# Patient Record
Sex: MALE | Race: WHITE | URBAN - METROPOLITAN AREA
[De-identification: names, ages, dates, MRNs, and addresses within clinical notes are randomized per-mention and may not be internally consistent; named-entity substitution may affect disease eponyms.]

---

## 2017-09-12 ENCOUNTER — OFFICE VISIT (OUTPATIENT)
Dept: FAMILY MEDICINE | Facility: CLINIC | Age: 47
End: 2017-09-12
Payer: COMMERCIAL

## 2017-09-12 VITALS
DIASTOLIC BLOOD PRESSURE: 80 MMHG | TEMPERATURE: 97.3 F | SYSTOLIC BLOOD PRESSURE: 124 MMHG | HEART RATE: 80 BPM | OXYGEN SATURATION: 96 % | WEIGHT: 171.1 LBS | BODY MASS INDEX: 25.93 KG/M2 | RESPIRATION RATE: 16 BRPM | HEIGHT: 68 IN

## 2017-09-12 DIAGNOSIS — Z23 ENCOUNTER FOR IMMUNIZATION: ICD-10-CM

## 2017-09-12 DIAGNOSIS — Z23 NEED FOR PROPHYLACTIC VACCINATION AND INOCULATION AGAINST INFLUENZA: ICD-10-CM

## 2017-09-12 DIAGNOSIS — Z02.89 HISTORY AND PHYSICAL EXAMINATION, IMMIGRATION: Primary | ICD-10-CM

## 2017-09-12 PROCEDURE — 90471 IMMUNIZATION ADMIN: CPT | Performed by: FAMILY MEDICINE

## 2017-09-12 PROCEDURE — 90472 IMMUNIZATION ADMIN EACH ADD: CPT | Performed by: FAMILY MEDICINE

## 2017-09-12 PROCEDURE — 86592 SYPHILIS TEST NON-TREP QUAL: CPT | Performed by: FAMILY MEDICINE

## 2017-09-12 PROCEDURE — 86480 TB TEST CELL IMMUN MEASURE: CPT | Performed by: FAMILY MEDICINE

## 2017-09-12 PROCEDURE — 90632 HEPA VACCINE ADULT IM: CPT | Performed by: FAMILY MEDICINE

## 2017-09-12 PROCEDURE — 87591 N.GONORRHOEAE DNA AMP PROB: CPT | Performed by: FAMILY MEDICINE

## 2017-09-12 PROCEDURE — 86780 TREPONEMA PALLIDUM: CPT | Mod: 90 | Performed by: FAMILY MEDICINE

## 2017-09-12 PROCEDURE — 90715 TDAP VACCINE 7 YRS/> IM: CPT | Performed by: FAMILY MEDICINE

## 2017-09-12 PROCEDURE — 86762 RUBELLA ANTIBODY: CPT | Performed by: FAMILY MEDICINE

## 2017-09-12 PROCEDURE — 86780 TREPONEMA PALLIDUM: CPT | Performed by: FAMILY MEDICINE

## 2017-09-12 PROCEDURE — 99385 PREV VISIT NEW AGE 18-39: CPT | Mod: 25 | Performed by: FAMILY MEDICINE

## 2017-09-12 PROCEDURE — 90688 IIV4 VACCINE SPLT 0.5 ML IM: CPT | Performed by: FAMILY MEDICINE

## 2017-09-12 PROCEDURE — 36415 COLL VENOUS BLD VENIPUNCTURE: CPT | Performed by: FAMILY MEDICINE

## 2017-09-12 PROCEDURE — 99000 SPECIMEN HANDLING OFFICE-LAB: CPT | Performed by: FAMILY MEDICINE

## 2017-09-12 PROCEDURE — 90716 VAR VACCINE LIVE SUBQ: CPT | Performed by: FAMILY MEDICINE

## 2017-09-12 ASSESSMENT — PAIN SCALES - GENERAL: PAINLEVEL: NO PAIN (0)

## 2017-09-12 NOTE — NURSING NOTE
"Chief Complaint   Patient presents with     Immigration Physical       Initial /80 (BP Location: Left arm, Patient Position: Chair, Cuff Size: Adult Regular)  Pulse 80  Temp 97.3  F (36.3  C) (Temporal)  Resp 16  Ht 5' 7.5\" (1.715 m)  Wt 171 lb 1.6 oz (77.6 kg)  SpO2 96%  BMI 26.4 kg/m2 Estimated body mass index is 26.4 kg/(m^2) as calculated from the following:    Height as of this encounter: 5' 7.5\" (1.715 m).    Weight as of this encounter: 171 lb 1.6 oz (77.6 kg).  Medication Reconciliation: complete     Health Maintenance Due   Topic Date Due     TETANUS IMMUNIZATION (SYSTEM ASSIGNED)  11/03/1988     LIPID SCREEN Q5 YR MALE (SYSTEM ASSIGNED)  11/03/2005     INFLUENZA VACCINE (SYSTEM ASSIGNED)  09/01/2017     Shiv Nash CMA      "

## 2017-09-12 NOTE — PROGRESS NOTES
Injectable Influenza Immunization Documentation    1.  Are you sick today? no  2.  Have you ever had Guillain-Atka Syndrome within 6 weeks of an influenza vaccionation?  No    3. Do you have a life-threatening allergy to eggs?  No    4. Do you have a life-threatening allergy to a component of the vaccine? May include antibiotics, gelatin or latex.  No     5. Have you ever had a reaction to a dose of flu vaccine that needed immediate medical attention?  No     Form completed by juan f

## 2017-09-12 NOTE — MR AVS SNAPSHOT
"              After Visit Summary   2017    Hira Shukla    MRN: [id number]           Patient Information     Date Of Birth          1970        Visit Information        Provider Department      2017 2:20 PM Nadiya Godinez MD [facility]        Today's Diagnoses     History and physical examination, immigration    -  1       Follow-ups after your visit        Who to contact     If you have questions or need follow up information about today's clinic visit or your schedule please contact [facility] directly at [phone].  Normal or non-critical lab and imaging results will be communicated to you by MUJINhart, letter or phone within 4 business days after the clinic has received the results. If you do not hear from us within 7 days, please contact the clinic through Automsoftt or phone. If you have a critical or abnormal lab result, we will notify you by phone as soon as possible.  Submit refill requests through Profitect or call your pharmacy and they will forward the refill request to us. Please allow 3 business days for your refill to be completed.          Additional Information About Your Visit        MyChart Information     Profitect lets you send messages to your doctor, view your test results, renew your prescriptions, schedule appointments and more. To sign up, go to [email or web address] . Click on \"Log in\" on the left side of the screen, which will take you to the Welcome page. Then click on \"Sign up Now\" on the right side of the page.     You will be asked to enter the access code listed below, as well as some personal information. Please follow the directions to create your username and password.     Your access code is: [email or web address]  Expires: 2017  4:58 PM     Your access code will  in 90 days. If you need help or a new code, please call your [facility] or [phone].        Care EveryWhere ID     This is your Care EveryWhere ID. This " "could be used by other organizations to access your [place] medical records  [id number]        Your Vitals Were     Pulse Temperature Respirations Height Pulse Oximetry BMI (Body Mass Index)    80 97.3  F (36.3  C) (Temporal) 16 5' 7.5\" (1.715 m) 96% 26.4 kg/m2       Blood Pressure from Last 3 Encounters:   09/12/17 124/80    Weight from Last 3 Encounters:   09/12/17 171 lb 1.6 oz (77.6 kg)              We Performed the Following     Anti Treponema     C IMMIGRATION PHYSICAL     M Tuberculosis by Quantiferon     Neisseria gonorrhoeae PCR     Rubella Antibody IgG Quantitative        Primary Care Provider    None Specified       No primary provider on file.        Equal Access to Services     JASEN GAXIOLA : Akila Flannery, lamonte kamara, ramone kaalmada mendy, chanell santana . So [facility] [phone].    ATENCIÓN: Si habla español, tiene a cochran disposición servicios gratuitos de asistencia lingüística. Llame al [phone].    We comply with applicable federal civil rights laws and [place] laws. We do not discriminate on the basis of race, color, national origin, age, disability sex, sexual orientation or gender identity.            Thank you!     Thank you for choosing [facility]  for your care. Our goal is always to provide you with excellent care. Hearing back from our patients is one way we can continue to improve our services. Please take a few minutes to complete the written survey that you may receive in the mail after your visit with us. Thank you!             Your Updated Medication List - Protect others around you: Learn how to safely use, store and throw away your medicines at www.disposemymeds.org.      Notice  As of 9/12/2017  4:58 PM    You have not been prescribed any medications.      "

## 2017-09-12 NOTE — PROGRESS NOTES
Hira is here for INS physical exam.  He moved from [facility] in July 2016. He lives in [place], [place] and is working for the [employer].  Stated that overall he is doing well and has no concern.  Denies of headache or dizziness.  No URI symptoms and denies of CP or SOB.  No fever or chill. No N/V/D/C.  No history of any of STI and denies any risk for it.  No penile discharge or having rash in the groin area. No of depression and has no history of depression, anxiety or mood disorder. No history or current homicidal or suicidal ideation.  Denies of hallucination and has no history of psychiatric hospitalization.  No pain and has no problem with sleeping.  Eating and drinking ok.  No problem with urination.  Denies of coughing or nigth sweat. No weight loss.  No exposure to TB.  No history of TB.  Has not positive PPD test.  May have received the TB vaccination when he was younger. Denies of using any kind of drug.      Problem list and histories reviewed & adjusted, as indicated.  Additional history: as documented      .PAST MEDICAL HISTORY:   Past Medical History:   Diagnosis Date     NO ACTIVE PROBLEMS        PAST SURGICAL HISTORY:   Past Surgical History:   Procedure Laterality Date     NO HISTORY OF SURGERY         FAMILY HISTORY:   Family History   Problem Relation Age of Onset     DIABETES Mother        SOCIAL HISTORY:   Social History   Substance Use Topics     Smoking status: Never Smoker     Smokeless tobacco: Never Used     Alcohol use Yes      Comment: socially          Allergies   Allergen Reactions     Penicillins Unknown     Was told as a child       No current outpatient prescriptions on file.         ROS:  Constitutional, HEENT, cardiovascular, pulmonary, gi and gu systems are negative, except as otherwise noted.      OBJECTIVE:                                                      Vitals: /80 (BP Location: Left arm, Patient Position: Chair, Cuff Size: Adult Regular)  Pulse 80   "Temp 97.3  F (36.3  C) (Temporal)  Resp 16  Ht 5' 7.5\" (1.715 m)  Wt 171 lb 1.6 oz (77.6 kg)  SpO2 96%  BMI 26.4 kg/m2  BMI= Body mass index is 26.4 kg/(m^2).   GENERAL: healthy, alert and no distress  EYES: Eyes grossly normal to inspection, PERRL and conjunctivae and sclerae normal  HENT: ear canals and TM's normal, nose and mouth without ulcers or lesions.  Nares are non-congested. Oropharynx is pink and moist. No tender with palpation to the sinuses.  NECK: no adenopathy, no asymmetry or masses and thyroid normal to palpation.  RESP: lungs clear to auscultation - no rales, rhonchi or wheezes  CV: regular rate and rhythm, no murmur, no peripheral edema and peripheral pulses strong  ABDOMEN: soft, nontender, no hepatosplenomegaly or masses and bowel sounds normal   (male): normal male genitalia without lesions or urethral discharge, no hernia  MS: no gross musculoskeletal defects noted, no edema. Walk with no limping, normal gait. All 4 extremities are equally in strength. Ankle, knees, hips, shoulders, elbows and wrists exams normal. Normal fine motor skills on fingers. Back is straight, no lordosis or scoliosis. No tender with palpation.  SKIN: no suspicious lesions or rashes  NEURO: Normal strength and tone, mentation intact and speech normal.  No focal deficit.  PSYCH: mentation appears normal, affect normal/bright. No hallucination, suicidal or homicidal ideation.  Appropriate insight.    Diagnostic Test Results:   No results found for this or any previous visit (from the past 24 hour(s)).       ASSESSMENT/PLAN:                                                      1. Health examination of defined subpopulation  I personal reviewed the report of Medical Examination and Vaccination Record from the Department of [employer]. He has no chronic medical condition. Has not had positive PPD test and he displayed no symptoms of active TB.  No exposure to TB.   Will obtain the quantiferon level. Denies any " risk for STI, but will check for RPR (syphyllis) and gonorrhea. Exam showed no genital rash. He has no history of depression, anxiety, mood disorder and has never been diagnosed or hospitalized for any psychiatric disorders.  He displays no physical or mental disorders with associated harmful behavior. He denies of using drug . Reviewed his immunization record, lab ordered as below. Will fill out his paper on his behalf once the results are available.   Instructed him to not open the sealed envelope. All of his questions were answered and encourage to call in if has any concern.    Also inform him that he should follow up with his primary provider for his routine and chronic medical care. I did not address any chronic medical problem today.  He understands.      ICD-10-CM    1. History and physical examination, immigration Z02.89 C IMMIGRATION PHYSICAL     Anti Treponema     M Tuberculosis by Quantiferon     Rubella Antibody IgG Quantitative     Neisseria gonorrhoeae PCR   2. Encounter for immunization Z23 CHICKEN POX VACCINE,LIVE,SUBCUT     ADMIN 1st VACCINE     HEPATITIS A VACCINE (ADULT)     EA ADD'L VACCINE     TDAP VACCINE (ADACEL)   3. Need for prophylactic vaccination and inoculation against influenza Z23 FLU VACCINE, 3 YRS +, IM (QUADRIVALENT W/PRESERVATIVES/MULTI-DOSE) [51724]     Vaccine Administration, Initial [45072]         F/U as needed.    Nadiya Patterson Mai, MD  [facility]

## 2017-09-13 LAB
N GONORRHOEA DNA SPEC QL NAA+PROBE: NEGATIVE
RUBV IGG SERPL IA-ACNC: 156 IU/ML
SPECIMEN SOURCE: NORMAL
T PALLIDUM IGG+IGM SER QL: POSITIVE

## 2017-09-14 LAB
M TB TUBERC IFN-G BLD QL: NEGATIVE
M TB TUBERC IFN-G/MITOGEN IGNF BLD: 0.04 IU/ML
RPR SER QL: NEGATIVE

## 2017-09-15 ENCOUNTER — TELEPHONE (OUTPATIENT)
Dept: FAMILY MEDICINE | Facility: CLINIC | Age: 47
End: 2017-09-15

## 2017-09-15 NOTE — TELEPHONE ENCOUNTER
----- Message from Nadiya Patterson Mai, MD sent at 9/15/2017  7:14 AM CDT -----  Please let patient know that his immigration lab looks good.  INS paperwork was completed. He may pick it up or may be sent out at anytime.

## 2017-09-15 NOTE — TELEPHONE ENCOUNTER
Patient was informed that his paperwork is ready. Patient requested that it be mailed to the following address:    [address] [place] [id number]    The address was verified.     Encourage patient to pick the forms up versus mailing them. Once they go in the [place] Mail we have no control over the original paperwork or how long it takes to get them. We can not send any other way besides US Mail.     Shiv Nash, [facility]

## 2017-09-16 LAB — T PALLIDUM AB SER QL AGGL: REACTIVE

## 2017-09-20 ENCOUNTER — TELEPHONE (OUTPATIENT)
Dept: FAMILY MEDICINE | Facility: CLINIC | Age: 47
End: 2017-09-20

## 2017-09-20 NOTE — TELEPHONE ENCOUNTER
----- Message from Nadiya Patterson Mai, MD sent at 9/18/2017 10:51 PM CDT -----  Please let patient know that another lab result came back and showed that he is positive an infection. Please follow up to treat before I can complete his INS paper.

## 2017-09-20 NOTE — TELEPHONE ENCOUNTER
Patient was informed that another lab results came back and showed that he is positive for an infection.  Patient is scheduled for 9/22/17 for treatment.  Shiv Nash, CMA

## 2017-09-22 ENCOUNTER — OFFICE VISIT (OUTPATIENT)
Dept: FAMILY MEDICINE | Facility: CLINIC | Age: 47
End: 2017-09-22
Payer: COMMERCIAL

## 2017-09-22 VITALS
DIASTOLIC BLOOD PRESSURE: 76 MMHG | TEMPERATURE: 97.4 F | SYSTOLIC BLOOD PRESSURE: 136 MMHG | OXYGEN SATURATION: 100 % | WEIGHT: 173.6 LBS | HEART RATE: 80 BPM | BODY MASS INDEX: 26.79 KG/M2 | RESPIRATION RATE: 16 BRPM

## 2017-09-22 DIAGNOSIS — A53.9 SYPHILIS IN MALE: Primary | ICD-10-CM

## 2017-09-22 PROCEDURE — 99213 OFFICE O/P EST LOW 20 MIN: CPT | Performed by: FAMILY MEDICINE

## 2017-09-22 RX ORDER — DOXYCYCLINE 100 MG/1
100 TABLET ORAL 2 TIMES DAILY
Qty: 56 TABLET | Refills: 0 | Status: SHIPPED | OUTPATIENT
Start: 2017-09-22 | End: 2017-10-20

## 2017-09-22 ASSESSMENT — PAIN SCALES - GENERAL: PAINLEVEL: NO PAIN (0)

## 2017-09-22 NOTE — NURSING NOTE
"Chief Complaint   Patient presents with     Results       Initial /76 (BP Location: Left arm, Patient Position: Chair, Cuff Size: Adult Regular)  Pulse 80  Temp 97.4  F (36.3  C) (Temporal)  Resp 16  Wt 173 lb 9.6 oz (78.7 kg)  SpO2 100%  BMI 26.79 kg/m2 Estimated body mass index is 26.79 kg/(m^2) as calculated from the following:    Height as of 9/12/17: 5' 7.5\" (1.715 m).    Weight as of this encounter: 173 lb 9.6 oz (78.7 kg).  Medication Reconciliation: complete     Shiv Nash CMA      "

## 2017-09-22 NOTE — PROGRESS NOTES
SUBJECTIVE:   Hira Shukla is a 46 year old male who presents to clinic today for the following health issues:    Patient has come into the clinic to go over his results.     Hira is here today for follow-up on the lab results. He was seen couple weeks for immigration physical and lab work showed positive for syphilis infection. Stated that he has been in a monogamous relationship to his wife for years, but both he and his wife were in relationships before they were together.  Not recall if he ever been tested for STI.  Denies of any rash or lesion in the groin. Denies of headache or dizziness. No chest pain or shortness of breath. No acute change in his vision.  He denies of IV drugs use. No fever or chills. No other concerns today.    Problem list and histories reviewed & adjusted, as indicated.  Additional history: as documented      ROS:  Constitutional, HEENT, cardiovascular, pulmonary, gi and gu systems are negative, except as otherwise noted.    OBJECTIVE:                                                    /76 (BP Location: Left arm, Patient Position: Chair, Cuff Size: Adult Regular)  Pulse 80  Temp 97.4  F (36.3  C) (Temporal)  Resp 16  Wt 173 lb 9.6 oz (78.7 kg)  SpO2 100%  BMI 26.79 kg/m2    GENERAL: healthy, alert and no distress  NECK: no adenopathy.  RESP: lungs clear to auscultation - no rales, rhonchi or wheezes  CV: regular rate and rhythm, no murmur.  ABDOMEN: soft, nontender, no hepatosplenomegaly and bowel sounds normal   (male): normal male genitalia without lesions or urethral discharge, no hernia.  Testes are descended bilaterally. No tender with palpation to the last deference. No testicular mass.  SKIN: no suspicious lesions or rashes. No tertiary or more advanced syphilis lesion  NEURO: Normal strength and tone.  Cranial nerve II-12 are intact. No focal neurological deficit. DTR +2/2 throughout.    Results for orders placed or performed in visit on 09/12/17   Anti  Treponema   Result Value Ref Range    Treponema pallidum Antibody Positive (A) NEG^Negative   M Tuberculosis by Quantiferon   Result Value Ref Range    M Tuberculosis Result Negative NEG^Negative    M Tuberculosis Antigen Value 0.04 IU/mL   Rubella Antibody IgG Quantitative   Result Value Ref Range    Rubella Antibody IgG Quantitative 156 IU/mL   RPR screen with reflex to confirm   Result Value Ref Range    Rapid Plasma Reagin Negative NEG^Negative   Treponema pallidum antibody confirm   Result Value Ref Range    T Pallidum by TP-PA conf Reactive (A) Non Reactive   Neisseria gonorrhoeae PCR   Result Value Ref Range    Specimen Descrip Urine     N Gonorrhea PCR Negative NEG^Negative              ASSESSMENT/PLAN:                                                        ICD-10-CM    1. Syphilis in male A53.9 doxycycline Monohydrate 100 MG TABS        Discussed with patient about the significance of the finding. Informed him that he has syphilis and based on the history he is probably has had for a while.  I discussed with him about the nature of the conditions and informed him that this is an STD.  He needs to inform all of his sexual partners since that the last time he had STD checked so that they can get checked and treated if indicated.  Educated him on safe sex and encouraged abstinence, monogamous relationship and/or use condom consistently. He is allergic to penicillin. Will treat him with Doxycyline and he was instructed to take it as prescribed. Rechecked lab in 6 and 12 months. Recommended to check for other STD such as HIV, Hep B and others but he declined. He preferred to have them check through his primary provider.      Nadiya Patterson Mai, MD  [facility]

## 2017-09-22 NOTE — MR AVS SNAPSHOT
"              After Visit Summary   2017    Hira Shukla    MRN: [id number]           Patient Information     Date Of Birth          1970        Visit Information        Provider Department      2017 9:00 AM Nadiya Godinez MD [facility]        Today's Diagnoses     Syphilis in male    -  1       Follow-ups after your visit        Who to contact     If you have questions or need follow up information about today's clinic visit or your schedule please contact [facility] directly at [phone].  Normal or non-critical lab and imaging results will be communicated to you by Bokehart, letter or phone within 4 business days after the clinic has received the results. If you do not hear from us within 7 days, please contact the clinic through Bokehart or phone. If you have a critical or abnormal lab result, we will notify you by phone as soon as possible.  Submit refill requests through LiveHealthier or call your pharmacy and they will forward the refill request to us. Please allow 3 business days for your refill to be completed.          Additional Information About Your Visit        MyChart Information     LiveHealthier lets you send messages to your doctor, view your test results, renew your prescriptions, schedule appointments and more. To sign up, go to [email or web address] . Click on \"Log in\" on the left side of the screen, which will take you to the Welcome page. Then click on \"Sign up Now\" on the right side of the page.     You will be asked to enter the access code listed below, as well as some personal information. Please follow the directions to create your username and password.     Your access code is: [email or web address]  Expires: 2017  4:58 PM     Your access code will  in 90 days. If you need help or a new code, please call your [facility] or [phone].        Care EveryWhere ID     This is your Care EveryWhere ID. This could be used by other " organizations to access your [place] medical records  [id number]        Your Vitals Were     Pulse Temperature Respirations Pulse Oximetry BMI (Body Mass Index)       80 97.4  F (36.3  C) (Temporal) 16 100% 26.79 kg/m2        Blood Pressure from Last 3 Encounters:   09/22/17 136/76   09/12/17 124/80    Weight from Last 3 Encounters:   09/22/17 173 lb 9.6 oz (78.7 kg)   09/12/17 171 lb 1.6 oz (77.6 kg)              Today, you had the following     No orders found for display         Today's Medication Changes          These changes are accurate as of: 9/22/17  4:45 PM.  If you have any questions, ask your nurse or doctor.               Start taking these medicines.        Dose/Directions    doxycycline Monohydrate 100 MG Tabs   Used for:  Syphilis in male   Started by:  Nadiya Godinez MD        Dose:  100 mg   Take 100 mg by mouth 2 times daily for 28 days   Quantity:  56 tablet   Refills:  0            Where to get your medicines      These medications were sent to [address], [address] AT [address], [place] [address], [facility] [address]     Phone:  [phone]     doxycycline Monohydrate 100 MG Tabs                Primary Care Provider    None Specified       No primary provider on file.        Equal Access to Services     JASEN GAXIOLA AH: Akila alexis Soruben, waaxda luqadaha, qaybta kaalmada adesimona, chanell may. So [facility] [phone].    ATENCIÓN: Si habla español, tiene a cochran disposición servicios gratuitos de asistencia lingüística. Llame al [phone].    We comply with applicable federal civil rights laws and [place] laws. We do not discriminate on the basis of race, color, national origin, age, disability sex, sexual orientation or gender identity.            Thank you!     Thank you for choosing [facility]  for your care. Our goal is always to provide you with excellent care.  Hearing back from our patients is one way we can continue to improve our services. Please take a few minutes to complete the written survey that you may receive in the mail after your visit with us. Thank you!             Your Updated Medication List - Protect others around you: Learn how to safely use, store and throw away your medicines at www.disposemymeds.org.          This list is accurate as of: 9/22/17  4:45 PM.  Always use your most recent med list.                   Brand Name Dispense Instructions for use Diagnosis    doxycycline Monohydrate 100 MG Tabs     56 tablet    Take 100 mg by mouth 2 times daily for 28 days    Syphilis in male

## 2017-10-10 ENCOUNTER — TELEPHONE (OUTPATIENT)
Dept: FAMILY MEDICINE | Facility: CLINIC | Age: 47
End: 2017-10-10

## 2017-10-10 NOTE — TELEPHONE ENCOUNTER
Reason for Call:  Other call back    Detailed comments: patient is calling to follow up on his paperwork, please advise on timing    Phone Number Patient can be reached at: Home number on file [phone] (home)    Best Time:     Can we leave a detailed message on this number? YES    Call taken on 10/10/2017 at 10:30 AM by Carie Casanova

## 2017-10-11 NOTE — TELEPHONE ENCOUNTER
Spoke to patient he wants it mailed I verified address. And put in for it to be mailed.  Vianney Segovia MA 10/11/2017

## 2018-01-04 ENCOUNTER — OFFICE VISIT (OUTPATIENT)
Dept: FAMILY MEDICINE | Facility: CLINIC | Age: 48
End: 2018-01-04
Payer: COMMERCIAL

## 2018-01-04 VITALS
HEART RATE: 100 BPM | WEIGHT: 180 LBS | DIASTOLIC BLOOD PRESSURE: 82 MMHG | BODY MASS INDEX: 28.25 KG/M2 | RESPIRATION RATE: 14 BRPM | HEIGHT: 67 IN | TEMPERATURE: 97.6 F | SYSTOLIC BLOOD PRESSURE: 136 MMHG | OXYGEN SATURATION: 100 %

## 2018-01-04 DIAGNOSIS — Z02.89 HISTORY AND PHYSICAL EXAMINATION, IMMIGRATION: ICD-10-CM

## 2018-01-04 DIAGNOSIS — Z86.19 HX OF SYPHILIS: Primary | ICD-10-CM

## 2018-01-04 DIAGNOSIS — Z21 ASYMPTOMATIC HUMAN IMMUNODEFICIENCY VIRUS (HIV) INFECTION STATUS (H): ICD-10-CM

## 2018-01-04 DIAGNOSIS — Z87.898 HISTORY OF IMPAIRED GLUCOSE TOLERANCE: ICD-10-CM

## 2018-01-04 PROCEDURE — 99214 OFFICE O/P EST MOD 30 MIN: CPT | Performed by: FAMILY MEDICINE

## 2018-01-04 ASSESSMENT — PAIN SCALES - GENERAL: PAINLEVEL: NO PAIN (0)

## 2018-01-04 NOTE — MR AVS SNAPSHOT
"              After Visit Summary   1/4/2018    Hira Shukla    MRN: [id number]           Patient Information     Date Of Birth          1970        Visit Information        Provider Department      1/4/2018 9:40 AM Nadiya Godinez MD [facility]        Today's Diagnoses     Hx of syphilis    -  1    Asymptomatic human immunodeficiency virus (HIV) infection status (H)        History of impaired glucose tolerance        History and physical examination, immigration           Follow-ups after your visit        Follow-up notes from your care team     Return if symptoms worsen or fail to improve.      Who to contact     If you have questions or need follow up information about today's clinic visit or your schedule please contact [facility] directly at [phone].  Normal or non-critical lab and imaging results will be communicated to you by EyeEmhart, letter or phone within 4 business days after the clinic has received the results. If you do not hear from us within 7 days, please contact the clinic through EyeEmhart or phone. If you have a critical or abnormal lab result, we will notify you by phone as soon as possible.  Submit refill requests through Study2gether or call your pharmacy and they will forward the refill request to us. Please allow 3 business days for your refill to be completed.          Additional Information About Your Visit        EyeEmhart Information     Study2gether lets you send messages to your doctor, view your test results, renew your prescriptions, schedule appointments and more. To sign up, go to [email or web address] . Click on \"Log in\" on the left side of the screen, which will take you to the Welcome page. Then click on \"Sign up Now\" on the right side of the page.     You will be asked to enter the access code listed below, as well as some personal information. Please follow the directions to create your username and password.     Your access code is: " [email or web address]  Expires: 2018  8:13 PM     Your access code will  in 90 days. If you need help or a new code, please call your [facility] clinic or [phone].        Care EveryWhere ID     This is your Care EveryWhere ID. This could be used by other organizations to access your [facility] medical records  [id number]        Your Vitals Were     Pulse Temperature Respirations Height Pulse Oximetry BMI (Body Mass Index)    100 97.6  F (36.4  C) (Temporal) 14 5' 7\" (1.702 m) 100% 28.19 kg/m2       Blood Pressure from Last 3 Encounters:   18 136/82   17 136/76   17 124/80    Weight from Last 3 Encounters:   18 180 lb (81.6 kg)   17 173 lb 9.6 oz (78.7 kg)   17 171 lb 1.6 oz (77.6 kg)              Today, you had the following     No orders found for display       Primary Care Provider Office Phone # Fax #    Nadiya Patterson Mai, [phone] [address] [place] [address]        Equal Access to Services     JASEN GAXIOLA AH: Hadii rossana murphyo Soafiaali, waaxda luqadaha, qaybta kaalmada adeegyada, chanell may. So [facility] [phone].    ATENCIÓN: Si habla español, tiene a cochran disposición servicios gratuitos de asistencia lingüística. Llame al [phone].    We comply with applicable federal civil rights laws and [place] laws. We do not discriminate on the basis of race, color, national origin, age, disability, sex, sexual orientation, or gender identity.            Thank you!     Thank you for choosing [facility]  for your care. Our goal is always to provide you with excellent care. Hearing back from our patients is one way we can continue to improve our services. Please take a few minutes to complete the written survey that you may receive in the mail after your visit with us. Thank you!             Your Updated Medication List - Protect others around you: Learn how to safely use, store and throw away your " medicines at www.disposemymeds.org.          This list is accurate as of: 1/4/18 11:59 PM.  Always use your most recent med list.                   Brand Name Dispense Instructions for use Diagnosis    efavirenz-emtrictabine-tenofovir 600-200-300 MG per tablet    ATRIPLA

## 2018-01-04 NOTE — PROGRESS NOTES
"  SUBJECTIVE:   Hira Shukla is a 47 year old male who presents to clinic today for the following health issues:    Recheck Syphilis    Hira is here today for follow-up on his syphilis.  He was seen about 4 months ago for immigration physical and its workup showed positive for syphilis.  He was treated with doxycycline - started on 9/22/17 and completed on 10/20/17.  No side effect and was taking he medication as prescribed.  Stated that he has been doing well.  No rash. Unfortunately, the immigration office required I-693 form to be refilled again with updated information about his treatment.  Stated that he has no significant change since the last visit except that he was recently diagnosed with HIV.  Been seeing the infectious disease specialists and taking medication as prescribed.  His most recent CD4 level was normal.  Stated that his wife was evaluated and the work up was negative.  He has no other concern today.      Problem list and histories reviewed & adjusted, as indicated.  Additional history: as documented    Current Outpatient Prescriptions   Medication Sig Dispense Refill     efavirenz-emtrictabine-tenofovir (ATRIPLA) 600-200-300 MG per tablet        Allergies   Allergen Reactions     Penicillins Unknown     Was told as a child       Reviewed and updated as needed this visit by clinical staff  Tobacco  Allergies  Meds  Soc Hx      Reviewed and updated as needed this visit by Provider         ROS:  Constitutional, HEENT, cardiovascular, pulmonary, gi and gu systems are negative, except as otherwise noted.      OBJECTIVE:   /82 (BP Location: Left arm, Patient Position: Sitting, Cuff Size: Adult Regular)  Pulse 100  Temp 97.6  F (36.4  C) (Temporal)  Resp 14  Ht 5' 7\" (1.702 m)  Wt 180 lb (81.6 kg)  SpO2 100%  BMI 28.19 kg/m2  Body mass index is 28.19 kg/(m^2).  GENERAL: healthy, alert and no distress  NECK: no adenopathy, no asymmetry, masses, or scars and thyroid normal to " palpation  RESP: lungs clear to auscultation - no rales, rhonchi or wheezes  CV: regular rate and rhythm, normal S1 S2, no S3 or S4, no murmur, click or rub, no peripheral edema and peripheral pulses strong  ABDOMEN: soft, nontender, no hepatosplenomegaly, no masses and bowel sounds normal  MS: no gross musculoskeletal defects noted, no edema  SKIN: no suspicious lesions or rashes  PSYCH: mentation appears normal, affect normal/bright    Diagnostic Test Results:  No results found for this or any previous visit (from the past 24 hour(s)).    ASSESSMENT/PLAN:     1. Hx of syphilis  Was treated with doxycycline for 4 weeks.  He took the medication as prescribed with no side effect.  Recommended to repeat screening test again at 6 months and 12 months from the time he was treated.  Safe sex and STD prevention discussed.    2. Asymptomatic human immunodeficiency virus (HIV) infection status (H)  Stable and is being managed by the infectious disease.  Continue with the medication.  Encouraged him to follow-up with her specialist as per his/her recommendation.    3. History of impaired glucose tolerance  Most recent workup was normal and is no longer on metformin.  Encouraged to stay away from high sugar and high carb diet.  Also recommend daily exercise.    4. History and physical examination, immigration  The I 693 form was filled out on his behalf with updated syphilis treatment and HIV status.  Up-to-date for his immunization.      Nadiya Patterson Mai, MD  [facility]

## 2018-01-04 NOTE — NURSING NOTE
"Chief Complaint   Patient presents with     RECHECK       Initial /82 (BP Location: Left arm, Patient Position: Sitting, Cuff Size: Adult Regular)  Pulse 100  Temp 97.6  F (36.4  C) (Temporal)  Resp 14  Ht 5' 7\" (1.702 m)  Wt 180 lb (81.6 kg)  SpO2 100%  BMI 28.19 kg/m2 Estimated body mass index is 28.19 kg/(m^2) as calculated from the following:    Height as of this encounter: 5' 7\" (1.702 m).    Weight as of this encounter: 180 lb (81.6 kg).  Medication Reconciliation: complete   Vianney Becerra MA 1/4/2018      "

## 2018-01-05 PROBLEM — Z86.19 HX OF SYPHILIS: Status: ACTIVE | Noted: 2018-01-05

## 2018-01-05 PROBLEM — Z21 ASYMPTOMATIC HUMAN IMMUNODEFICIENCY VIRUS (HIV) INFECTION STATUS (H): Status: ACTIVE | Noted: 2018-01-05

## 2018-01-05 PROBLEM — Z87.898 HISTORY OF IMPAIRED GLUCOSE TOLERANCE: Status: ACTIVE | Noted: 2018-01-05

## 2018-01-08 ENCOUNTER — TELEPHONE (OUTPATIENT)
Dept: FAMILY MEDICINE | Facility: CLINIC | Age: 48
End: 2018-01-08

## 2018-01-09 ENCOUNTER — TELEPHONE (OUTPATIENT)
Dept: FAMILY MEDICINE | Facility: CLINIC | Age: 48
End: 2018-01-09

## 2018-01-09 NOTE — TELEPHONE ENCOUNTER
Hira emailed me. I called him back and verified his identity and got verbal permission to look at his records.    He had some questions about the I693 form and I answered them to the best of my ability.    My final suggestion was to follow Dr. Godinez's advice and submit the form.    He may contact me again if any further questions.    Alo Coburn M.D.

## 2018-01-09 NOTE — TELEPHONE ENCOUNTER
Called patient to inform him his INS paperwork is ready to . He states he wants it to be mailed to him.   I told patient we cannot be held responsible it is lost in the mail. He understood, and repeated back to me that we will not be held responsible if paperwork does not make it to his house.   Verified address with patient.     INS paperwork mailed.

## 2020-03-11 ENCOUNTER — HEALTH MAINTENANCE LETTER (OUTPATIENT)
Age: 50
End: 2020-03-11

## 2020-12-27 ENCOUNTER — HEALTH MAINTENANCE LETTER (OUTPATIENT)
Age: 50
End: 2020-12-27

## 2021-04-25 ENCOUNTER — HEALTH MAINTENANCE LETTER (OUTPATIENT)
Age: 51
End: 2021-04-25

## 2021-10-09 ENCOUNTER — HEALTH MAINTENANCE LETTER (OUTPATIENT)
Age: 51
End: 2021-10-09

## 2022-05-21 ENCOUNTER — HEALTH MAINTENANCE LETTER (OUTPATIENT)
Age: 52
End: 2022-05-21

## 2022-09-17 ENCOUNTER — HEALTH MAINTENANCE LETTER (OUTPATIENT)
Age: 52
End: 2022-09-17

## 2023-06-04 ENCOUNTER — HEALTH MAINTENANCE LETTER (OUTPATIENT)
Age: 53
End: 2023-06-04